# Patient Record
Sex: FEMALE | Race: WHITE
[De-identification: names, ages, dates, MRNs, and addresses within clinical notes are randomized per-mention and may not be internally consistent; named-entity substitution may affect disease eponyms.]

---

## 2017-04-20 ENCOUNTER — HOSPITAL ENCOUNTER (OUTPATIENT)
Dept: HOSPITAL 17 - HPND | Age: 27
End: 2017-04-20
Attending: OBSTETRICS & GYNECOLOGY
Payer: MEDICAID

## 2017-04-20 DIAGNOSIS — Z79.4: ICD-10-CM

## 2017-04-20 DIAGNOSIS — O24.011: Primary | ICD-10-CM

## 2017-04-20 PROCEDURE — 76801 OB US < 14 WKS SINGLE FETUS: CPT

## 2017-04-20 PROCEDURE — 76817 TRANSVAGINAL US OBSTETRIC: CPT

## 2017-05-03 ENCOUNTER — HOSPITAL ENCOUNTER (OUTPATIENT)
Dept: HOSPITAL 17 - HPND | Age: 27
End: 2017-05-03
Attending: OBSTETRICS & GYNECOLOGY
Payer: MEDICAID

## 2017-05-03 DIAGNOSIS — O36.80X0: ICD-10-CM

## 2017-05-03 DIAGNOSIS — O24.414: Primary | ICD-10-CM

## 2017-05-03 PROCEDURE — 76801 OB US < 14 WKS SINGLE FETUS: CPT

## 2017-05-03 PROCEDURE — 76817 TRANSVAGINAL US OBSTETRIC: CPT

## 2017-11-17 ENCOUNTER — HOSPITAL ENCOUNTER (EMERGENCY)
Dept: HOSPITAL 17 - NEPE | Age: 27
LOS: 1 days | Discharge: HOME | End: 2017-11-18
Payer: COMMERCIAL

## 2017-11-17 VITALS
DIASTOLIC BLOOD PRESSURE: 80 MMHG | RESPIRATION RATE: 16 BRPM | SYSTOLIC BLOOD PRESSURE: 148 MMHG | TEMPERATURE: 98.2 F | OXYGEN SATURATION: 98 % | HEART RATE: 77 BPM

## 2017-11-17 VITALS — HEIGHT: 67 IN | WEIGHT: 138.89 LBS | BODY MASS INDEX: 21.8 KG/M2

## 2017-11-17 DIAGNOSIS — B34.9: ICD-10-CM

## 2017-11-17 DIAGNOSIS — B96.89: ICD-10-CM

## 2017-11-17 DIAGNOSIS — O98.511: Primary | ICD-10-CM

## 2017-11-17 DIAGNOSIS — O23.41: ICD-10-CM

## 2017-11-17 DIAGNOSIS — O24.911: ICD-10-CM

## 2017-11-17 DIAGNOSIS — Z79.4: ICD-10-CM

## 2017-11-17 DIAGNOSIS — Z3A.11: ICD-10-CM

## 2017-11-17 LAB
ALP SERPL-CCNC: 65 U/L (ref 45–117)
ALT SERPL-CCNC: 18 U/L (ref 10–53)
ANION GAP SERPL CALC-SCNC: 11 MEQ/L (ref 5–15)
AST SERPL-CCNC: 19 U/L (ref 15–37)
BACTERIA #/AREA URNS HPF: (no result) /HPF
BASOPHILS # BLD AUTO: 0 TH/MM3 (ref 0–0.2)
BASOPHILS NFR BLD: 0.3 % (ref 0–2)
BILIRUB SERPL-MCNC: 0.3 MG/DL (ref 0.2–1)
BUN SERPL-MCNC: 12 MG/DL (ref 7–18)
CHLORIDE SERPL-SCNC: 102 MEQ/L (ref 98–107)
COLOR UR: YELLOW
COMMENT (UR): (no result)
CULTURE IF INDICATED: (no result)
EOSINOPHIL # BLD: 0.1 TH/MM3 (ref 0–0.4)
EOSINOPHIL NFR BLD: 0.8 % (ref 0–4)
ERYTHROCYTE [DISTWIDTH] IN BLOOD BY AUTOMATED COUNT: 13.5 % (ref 11.6–17.2)
GFR SERPLBLD BASED ON 1.73 SQ M-ARVRAT: 122 ML/MIN (ref 89–?)
GLUCOSE UR STRIP-MCNC: 70 MG/DL
HCO3 BLD-SCNC: 21.5 MEQ/L (ref 21–32)
HCT VFR BLD CALC: 41.9 % (ref 35–46)
HEMO FLAGS: (no result)
HGB UR QL STRIP: (no result)
KETONES UR STRIP-MCNC: 150 MG/DL
LEUKOCYTE ESTERASE UR QL STRIP: (no result) /HPF (ref 0–5)
LYMPHOCYTES # BLD AUTO: 1.6 TH/MM3 (ref 1–4.8)
LYMPHOCYTES NFR BLD AUTO: 17.8 % (ref 9–44)
MCH RBC QN AUTO: 27.8 PG (ref 27–34)
MCHC RBC AUTO-ENTMCNC: 32.8 % (ref 32–36)
MCV RBC AUTO: 84.5 FL (ref 80–100)
MONOCYTES NFR BLD: 8 % (ref 0–8)
MUCOUS THREADS #/AREA URNS LPF: (no result) /LPF
NEUTROPHILS # BLD AUTO: 6.6 TH/MM3 (ref 1.8–7.7)
NEUTROPHILS NFR BLD AUTO: 73.1 % (ref 16–70)
NITRITE UR QL STRIP: (no result)
PLATELET # BLD: 262 TH/MM3 (ref 150–450)
POTASSIUM SERPL-SCNC: 4 MEQ/L (ref 3.5–5.1)
RBC # BLD AUTO: 4.96 MIL/MM3 (ref 4–5.3)
RBC #/AREA URNS HPF: (no result) /HPF (ref 0–3)
SODIUM SERPL-SCNC: 134 MEQ/L (ref 136–145)
SP GR UR STRIP: 1.03 (ref 1–1.03)
SQUAMOUS #/AREA URNS HPF: > 8 /HPF (ref 0–5)
WBC # BLD AUTO: 9 TH/MM3 (ref 4–11)

## 2017-11-17 PROCEDURE — 81001 URINALYSIS AUTO W/SCOPE: CPT

## 2017-11-17 PROCEDURE — 96361 HYDRATE IV INFUSION ADD-ON: CPT

## 2017-11-17 PROCEDURE — 87086 URINE CULTURE/COLONY COUNT: CPT

## 2017-11-17 PROCEDURE — 87804 INFLUENZA ASSAY W/OPTIC: CPT

## 2017-11-17 PROCEDURE — 99284 EMERGENCY DEPT VISIT MOD MDM: CPT

## 2017-11-17 PROCEDURE — 85025 COMPLETE CBC W/AUTO DIFF WBC: CPT

## 2017-11-17 PROCEDURE — 80053 COMPREHEN METABOLIC PANEL: CPT

## 2017-11-17 PROCEDURE — 96374 THER/PROPH/DIAG INJ IV PUSH: CPT

## 2017-11-17 NOTE — PD
HPI


Chief Complaint:  Cold / Flu Symptoms


Time Seen by Provider:  22:28


Travel History


International Travel<30 days:  No


Contact w/Intl Traveler<30days:  No


Traveled to known affect area:  No





History of Present Illness


HPI


27-year-old female patient with history of diabetes, currently 11 weeks pregnant

, had an ultrasound done and pregnancy was normal last week, therefore several 

days of cough, cold symptoms, nasal congestion, nausea, flulike symptoms, 

subjective fevers.  She denies any vomiting, diarrhea, or other symptoms.  She 

does not know any sick contacts.





Modifying Factors: None


Associated Signs & Symptoms: Flulike symptoms


Risk Factors: Pregnant, diabetic





PFSH


Past Medical History


Asthma:  No


Autoimmune Disease:  No


Blood Disorders:  No


Anxiety:  Yes


Depression:  Yes


Heart Rhythm Problems:  No


Cancer:  No


Cardiovascular Problems:  No


Chest Pain:  No


Cystic Fibrosis:  No


Diabetes:  Yes


Patient Takes Glucophage:  No


Diminished Hearing:  No


Gastrointestinal Disorders:  Yes (HAD GALLBLADDER REMOVED)


Genitourinary:  No


Headaches:  No


Hypertension:  No


Medical other:  Yes


Musculoskeletal:  No


Neurologic:  No


Psychiatric:  No


Reproductive:  No


Respiratory:  No


Immunizations Current:  Yes


Sleep Apnea:  No


Tetanus Vaccination:  < 5 Years


Influenza Vaccination:  Yes


Pregnant?:  Pregnant


LMP:  17


:  1





Past Surgical History


Abdominal Surgery:  Yes (GALLBLADDER REMOVED )


AICD:  No


Appendectomy:  No


Arteriovenous Shunt:  No


Cardiac Surgery:  No


Cholecystectomy:  Yes


Ear Surgery:  No


Endocrine Surgery:  No


Eye Surgery:  No


Genitourinary Surgery:  No


Gynecologic Surgery:  No


Insulin Pump:  No


Joint Replacement:  No


Neurologic Surgery:  No


Oral Surgery:  No


Pacemaker:  No


Thoracic Surgery:  No


Other Surgery:  Yes





Social History


Alcohol Use:  No


Tobacco Use:  No


Substance Use:  No





Allergies-Medications


(Allergen,Severity, Reaction):  


Coded Allergies:  


     morphine (Verified  Adverse Reaction, Intermediate, migraines, 17)


Reported Meds & Prescriptions





Reported Meds & Active Scripts


Active


Reported


Lantus Inj (Insulin Glargine) 100 Unit/Ml Inj 25 Units SQ HS


Humalog Kwikpen Pen Inj (Insulin Lispro (Human) Inj) 300 Unit/3 Ml Pen 1 Units 

SQ 








Review of Systems


Except as stated in HPI:  all other systems reviewed are Neg





Physical Exam


Narrative


GENERAL: Well-developed young


SKIN: Focused skin assessment warm/dry.


HEAD: Atraumatic. Normocephalic. 


EYES: Pupils equal and round. No scleral icterus. No injection or drainage. 


ENT: No nasal bleeding or discharge.  Mucous membranes pink and moist.


NECK: Trachea midline. No JVD. 


CARDIOVASCULAR: Regular rate and rhythm.  No murmur appreciated.


RESPIRATORY: No accessory muscle use. Clear to auscultation. Breath sounds 

equal bilaterally. 


GASTROINTESTINAL: Abdomen soft, non-tender, nondistended. Hepatic and splenic 

margins not palpable. 


MUSCULOSKELETAL: No obvious deformities. No clubbing.  No cyanosis.  No edema. 


NEUROLOGICAL: Awake and alert. No obvious cranial nerve deficits.  Motor 

grossly within normal limits. Normal speech.


PSYCHIATRIC: Appropriate mood and affect; insight and judgment normal.





Data


Data


Last Documented VS





Vital Signs








  Date Time  Temp Pulse Resp B/P (MAP) Pulse Ox O2 Delivery O2 Flow Rate FiO2


 


17 22:28   18  98 Room Air  


 


17 21:49 98.2 77  148/80 (102)    








Orders





 Orders


Complete Blood Count With Diff (17 22:28)


Comprehensive Metabolic Panel (17 22:28)


Urinalysis - C+S If Indicated (17 22:28)


Influenzae A/B Antigen (17 22:28)


Sodium Chlor 0.9% 1000 Ml Inj (Ns 1000 M (17 22:30)


Ondansetron Inj (Zofran Inj) (17 22:30)





Labs





Laboratory Tests








Test


  17


20:35


 


White Blood Count 9.0 TH/MM3 


 


Red Blood Count 4.96 MIL/MM3 


 


Hemoglobin 13.8 GM/DL 


 


Hematocrit 41.9 % 


 


Mean Corpuscular Volume 84.5 FL 


 


Mean Corpuscular Hemoglobin 27.8 PG 


 


Mean Corpuscular Hemoglobin


Concent 32.8 % 


 


 


Red Cell Distribution Width 13.5 % 


 


Platelet Count 262 TH/MM3 


 


Mean Platelet Volume 8.1 FL 


 


Neutrophils (%) (Auto) 73.1 % 


 


Lymphocytes (%) (Auto) 17.8 % 


 


Monocytes (%) (Auto) 8.0 % 


 


Eosinophils (%) (Auto) 0.8 % 


 


Basophils (%) (Auto) 0.3 % 


 


Neutrophils # (Auto) 6.6 TH/MM3 


 


Lymphocytes # (Auto) 1.6 TH/MM3 


 


Monocytes # (Auto) 0.7 TH/MM3 


 


Eosinophils # (Auto) 0.1 TH/MM3 


 


Basophils # (Auto) 0.0 TH/MM3 


 


CBC Comment DIFF FINAL 


 


Differential Comment  


 


Urine Color YELLOW 


 


Urine Turbidity HAZY 


 


Urine pH 6.0 


 


Urine Specific Gravity 1.031 


 


Urine Protein 30 mg/dL 


 


Urine Glucose (UA) 70 mg/dL 


 


Urine Ketones 150 mg/dL 


 


Urine Occult Blood TRACE 


 


Urine Nitrite NEG 


 


Urine Bilirubin NEG 


 


Urine Urobilinogen 2.0 MG/DL 


 


Urine Leukocyte Esterase LARGE 


 


Blood Urea Nitrogen 12 MG/DL 


 


Creatinine 0.60 MG/DL 


 


Random Glucose 84 MG/DL 


 


Total Protein 7.6 GM/DL 


 


Albumin 3.3 GM/DL 


 


Calcium Level 9.1 MG/DL 


 


Alkaline Phosphatase 65 U/L 


 


Aspartate Amino Transf


(AST/SGOT) 19 U/L 


 


 


Alanine Aminotransferase


(ALT/SGPT) 18 U/L 


 


 


Total Bilirubin 0.3 MG/DL 


 


Sodium Level 134 MEQ/L 


 


Potassium Level 4.0 MEQ/L 


 


Chloride Level 102 MEQ/L 


 


Carbon Dioxide Level 21.5 MEQ/L 


 


Anion Gap 11 MEQ/L 


 


Estimat Glomerular Filtration


Rate 122 ML/MIN 


 











MDM


Medical Decision Making


Medical Screen Exam Complete:  Yes


Emergency Medical Condition:  Yes


Medical Record Reviewed:  Yes


Interpretation(s)





Laboratory Tests








Test


  17


20:35


 


Neutrophils (%) (Auto)


  73.1 %


(16.0-70.0)


 


Urine Turbidity HAZY (CLEAR) 


 


Urine Protein


  30 mg/dL


(NEG-TRACE)


 


Urine Glucose (UA) 70 mg/dL (NEG) 


 


Urine Ketones


  150 mg/dL


(NEG)


 


Urine Occult Blood TRACE (NEG) 


 


Urine Leukocyte Esterase LARGE (NEG) 


 


Albumin


  3.3 GM/DL


(3.4-5.0)


 


Sodium Level


  134 MEQ/L


(136-145)








Differential Diagnosis


Flulike symptoms: Influenza versus viral syndrome versus dehydration versus UTI 

versus metabolic issues


Narrative Course


Vital signs are stable in the ER.  Abdomen is benign and I do not suspect an 

acute intra-abdominal process.  Patient had confirmed pregnancy ultrasound a 

week ago.  Influenza test is negative.  Her urine does show signs of UTI.  My 

plan would be to treat her UTI as well.  We will have her follow-up with 

primary care physician.  Return for any worsening in symptoms as needed.  The 

plan has been discussed with her and she states understanding.





Diagnosis





 Primary Impression:  


 Viral syndrome


 Additional Impression:  


 UTI (urinary tract infection)


***Med/Other Pt SpecificInfo:  Prescription(s) given


Scripts


Metoclopramide (Reglan) 10 Mg Tab


10 MG PO QID Y for NAUSEA OR VOMITING, #12 TAB 0 Refills


   Prov: Dada Gonzalez MD         17 


Loratadine (Claritin) 10 Mg Cap


10 MG PO DAILY for Allergy Management, #10 CAP 0 Refills


   Prov: Dada Gonzalez MD         17 


Amoxicillin (Amoxicillin) 500 Mg Cap


500 MG PO BID for Infection for 7 Days, #14 CAP 0 Refills


   Prov: Dada Gonzalez MD         17


Disposition:  01 DISCHARGE HOME


Condition:  Stable











Dada Gonzalez MD 2017 22:35

## 2018-01-17 ENCOUNTER — HOSPITAL ENCOUNTER (OUTPATIENT)
Dept: HOSPITAL 17 - HPND | Age: 28
End: 2018-01-17
Payer: COMMERCIAL

## 2018-01-17 DIAGNOSIS — O24.012: Primary | ICD-10-CM

## 2018-01-17 PROCEDURE — 76811 OB US DETAILED SNGL FETUS: CPT

## 2018-02-07 ENCOUNTER — HOSPITAL ENCOUNTER (OUTPATIENT)
Dept: HOSPITAL 17 - HPND | Age: 28
End: 2018-02-07
Payer: COMMERCIAL

## 2018-02-07 DIAGNOSIS — O35.1XX0: Primary | ICD-10-CM

## 2018-02-07 PROCEDURE — 76816 OB US FOLLOW-UP PER FETUS: CPT

## 2018-02-07 PROCEDURE — 76827 ECHO EXAM OF FETAL HEART: CPT

## 2018-02-07 PROCEDURE — 76825 ECHO EXAM OF FETAL HEART: CPT

## 2018-02-07 PROCEDURE — 93325 DOPPLER ECHO COLOR FLOW MAPG: CPT

## 2018-02-15 ENCOUNTER — HOSPITAL ENCOUNTER (INPATIENT)
Dept: HOSPITAL 17 - HOBED | Age: 28
LOS: 2 days | Discharge: HOME | DRG: 781 | End: 2018-02-17
Attending: OBSTETRICS & GYNECOLOGY | Admitting: OBSTETRICS & GYNECOLOGY
Payer: COMMERCIAL

## 2018-02-15 VITALS
RESPIRATION RATE: 18 BRPM | DIASTOLIC BLOOD PRESSURE: 71 MMHG | HEART RATE: 89 BPM | SYSTOLIC BLOOD PRESSURE: 111 MMHG | TEMPERATURE: 98.7 F

## 2018-02-15 VITALS — RESPIRATION RATE: 18 BRPM | TEMPERATURE: 98.3 F

## 2018-02-15 VITALS — HEIGHT: 67 IN | BODY MASS INDEX: 23.88 KG/M2 | WEIGHT: 152.12 LBS

## 2018-02-15 VITALS — RESPIRATION RATE: 18 BRPM

## 2018-02-15 VITALS — SYSTOLIC BLOOD PRESSURE: 124 MMHG | DIASTOLIC BLOOD PRESSURE: 80 MMHG | HEART RATE: 97 BPM

## 2018-02-15 DIAGNOSIS — E10.65: ICD-10-CM

## 2018-02-15 DIAGNOSIS — O24.012: Primary | ICD-10-CM

## 2018-02-15 DIAGNOSIS — Z79.4: ICD-10-CM

## 2018-02-15 DIAGNOSIS — Q61.4: ICD-10-CM

## 2018-02-15 DIAGNOSIS — R06.02: ICD-10-CM

## 2018-02-15 DIAGNOSIS — Z3A.24: ICD-10-CM

## 2018-02-15 DIAGNOSIS — O36.8120: ICD-10-CM

## 2018-02-15 DIAGNOSIS — R07.9: ICD-10-CM

## 2018-02-15 LAB
BACTERIA #/AREA URNS HPF: (no result) /HPF
BASOPHILS # BLD AUTO: 0 TH/MM3 (ref 0–0.2)
BASOPHILS NFR BLD: 0.2 % (ref 0–2)
BUN SERPL-MCNC: 11 MG/DL (ref 7–18)
CALCIUM SERPL-MCNC: 8.6 MG/DL (ref 8.5–10.1)
CHLORIDE SERPL-SCNC: 105 MEQ/L (ref 98–107)
COLOR UR: (no result)
CREAT SERPL-MCNC: 0.59 MG/DL (ref 0.5–1)
EOSINOPHIL # BLD: 0.1 TH/MM3 (ref 0–0.4)
EOSINOPHIL NFR BLD: 1.6 % (ref 0–4)
ERYTHROCYTE [DISTWIDTH] IN BLOOD BY AUTOMATED COUNT: 13.6 % (ref 11.6–17.2)
GFR SERPLBLD BASED ON 1.73 SQ M-ARVRAT: 124 ML/MIN (ref 89–?)
GLUCOSE SERPL-MCNC: 172 MG/DL (ref 74–106)
GLUCOSE UR STRIP-MCNC: 1000 MG/DL
HBA1C MFR BLD: 9.7 % (ref 4.3–6)
HCO3 BLD-SCNC: 24.8 MEQ/L (ref 21–32)
HCT VFR BLD CALC: 33.6 % (ref 35–46)
HGB BLD-MCNC: 11.4 GM/DL (ref 11.6–15.3)
HGB UR QL STRIP: (no result)
KETONES UR STRIP-MCNC: 40 MG/DL
LYMPHOCYTES # BLD AUTO: 1.9 TH/MM3 (ref 1–4.8)
LYMPHOCYTES NFR BLD AUTO: 21.9 % (ref 9–44)
MCH RBC QN AUTO: 28.5 PG (ref 27–34)
MCHC RBC AUTO-ENTMCNC: 34 % (ref 32–36)
MCV RBC AUTO: 83.9 FL (ref 80–100)
MONOCYTE #: 0.4 TH/MM3 (ref 0–0.9)
MONOCYTES NFR BLD: 5 % (ref 0–8)
NEUTROPHILS # BLD AUTO: 6.2 TH/MM3 (ref 1.8–7.7)
NEUTROPHILS NFR BLD AUTO: 71.3 % (ref 16–70)
NITRITE UR QL STRIP: (no result)
PLATELET # BLD: 252 TH/MM3 (ref 150–450)
PMV BLD AUTO: 8.6 FL (ref 7–11)
RBC # BLD AUTO: 4 MIL/MM3 (ref 4–5.3)
SODIUM SERPL-SCNC: 137 MEQ/L (ref 136–145)
SP GR UR STRIP: 1.03 (ref 1–1.03)
SQUAMOUS #/AREA URNS HPF: 2 /HPF (ref 0–5)
URINE LEUKOCYTE ESTERASE: (no result)
WBC # BLD AUTO: 8.7 TH/MM3 (ref 4–11)

## 2018-02-15 PROCEDURE — 99283 EMERGENCY DEPT VISIT LOW MDM: CPT

## 2018-02-15 PROCEDURE — G0481 DRUG TEST DEF 8-14 CLASSES: HCPCS

## 2018-02-15 PROCEDURE — 81001 URINALYSIS AUTO W/SCOPE: CPT

## 2018-02-15 PROCEDURE — 80048 BASIC METABOLIC PNL TOTAL CA: CPT

## 2018-02-15 PROCEDURE — 93005 ELECTROCARDIOGRAM TRACING: CPT

## 2018-02-15 PROCEDURE — 83036 HEMOGLOBIN GLYCOSYLATED A1C: CPT

## 2018-02-15 PROCEDURE — 85025 COMPLETE CBC W/AUTO DIFF WBC: CPT

## 2018-02-15 PROCEDURE — 80307 DRUG TEST PRSMV CHEM ANLYZR: CPT

## 2018-02-15 PROCEDURE — 82947 ASSAY GLUCOSE BLOOD QUANT: CPT

## 2018-02-15 PROCEDURE — 82948 REAGENT STRIP/BLOOD GLUCOSE: CPT

## 2018-02-15 RX ADMIN — INSULIN ASPART SCH UNITS: 100 INJECTION, SOLUTION INTRAVENOUS; SUBCUTANEOUS at 18:38

## 2018-02-15 NOTE — PD
History of Present Illness


History of Present Illness


Plan of Care Note





I spoke with the pt and her  about her current pregnancy/DM status.  

Briefly, she is a 26y/o  @ 24.1wks.  She has a h/o Type 1DM dx'd at age 

7.  She had PNC in [place] but has not been seen since December.  She is 

followed by an endocrinologist and states that she takes 23 units lantus qHS 

and SSI humalog with meals.  She averages 140s fasting and 200-300 post 

prandial.  She states her A1C is 13 --> 8.8 currently.  She reports that her 

endocrinologist said her current sugars are good.  She has a h/o DKA multiple 

times.  





I spoke with pt and and  about glycemic control needs in pregnancy being 

<95 fasting and <140 2hr post prandial.  We reviewed increased risk of 

pregnancy loss, cardiac defects in baby, preE, macrosomia, and multiple other 

complications.  Advised pt that the best option would be to have [facility] admission, 

diabetic education, diabetic patterning, and obtain baseline labs.  





Pt and  agreed with admission.  Plan to include:





-- diabetic education


-- fetal echo (already performed)


-- 24hr urine protein collection


-- FSBS fasting, 2hr PP, HS, 3am


-- diabetic patterning as follows:


          - wt in kg x 0.7 factor (2nd TM) = total insulin daily requirement


          - 1/2 lantus in pm (24 units);  1/2 humalog with meals ()


          - titrate PRN


-- SCDs, regular diet


-- CBC, BMP, T&S, A1C


-- BID fetal strips











Quang Syed MD Feb 15, 2018 17:09

## 2018-02-15 NOTE — HHI.HP
HPI


Travel History


International Travel<30 Days:  No


Contact w/Intl Traveler<30Days:  No


Known Affected Area:  No





History of Present Illness


HPI


Mrs. Krishna is a 27 year old  at 24/1 weeks gestation with a past medical 

history of type 1 diabetes presenting to the OB ED today for cramping.  She 

states that this started a few days ago.  However today she experienced a sharp 

pain in her abdomen when she got out of the chair.  She described this pain as 

being above and below her belly that lasted for 2 minutes and caused her to cry 

out in pain.  She is also stating that she has decreased fetal movement.  She 

states that the baby has not been active like he usually is.  However, has been 

active since coming to the hospital.  She has experienced chest pain on and off 

that she describes as chest tightening and pressure that occurs at rest but 

causes shortness of breath.  She states that this tightness and pressure is 

relieved by laying back.





No vaginal bleeding, no leakage of fluids, no contractions, no dysuria, no leg 

pain.





Type I DM-Lantus 23 units at night and Humalog sliding scale.  States that her 

fasting sugars in the morning is in the 140s and postprandials are in the 200s.

  Patient states that she drinks 2 bottles of flavored water a day, some Propel 

energy drinks, some sodas, and a couple of non-caffeinated coffee





History


Past Medical History


*** Narrative Medical


Type 1 diabetes, sees an endocrinologist regularly





Obstetric History


Obstetric History





Spontaneous  at 5 weeks gestation, 2017





Past Surgical History


*** Narrative Surgical


Cholecystectomy





Family History


*** Narrative Family History


Mother-SLE, MS, transverse myelitis, Bartter's syndrome


Father-hypertension





Social History


*** Narrative Social History


Lives with her  and grandparents


Does not work outside the home


Denies alcohol, tobacco, illicit drug use


Alcohol Use:  No


Tobacco Use:  No


Substance Abuse:  No





Allergies-Medications


(Allergen,Severity, Reaction):  


Coded Allergies:  


     morphine (Verified  Adverse Reaction, Intermediate, migraines, 17)


Home Meds


Active Scripts


Metoclopramide (Reglan) 10 Mg Tab, 10 MG PO QID Y for NAUSEA OR VOMITING, #12 

TAB 0 Refills


   Prov:Dada Gonzalez MD         17


Loratadine (Claritin) 10 Mg Cap, 10 MG PO DAILY for Allergy Management, #10 CAP 

0 Refills


   Prov:Dada Gonzalez MD         17


Amoxicillin (Amoxicillin) 500 Mg Cap, 500 MG PO BID for Infection for 7 Days, #

14 CAP 0 Refills


   Prov:Dada Gonzalez MD         17


Reported Medications


Insulin Glargine Inj (Lantus Inj) 100 Unit/Ml Inj, 25 UNITS SQ HS


   17


Insulin Lispro (Human) Inj (Humalog Kwikpen Pen Inj) 300 Unit/3 Ml Pen, 1 UNITS 

SQ for Blood Sugar Management, PEN 0 Refills


   17





Review of Systems


General / Constitutional:  No: Fever, Chills


Eyes:  No: Blurred Vision


HENT:  No: Headaches


Cardiovascular:  Chest Pain or Discomfort


Respiratory:  Short of Breath, No: Cough


Gastrointestinal:  No: Nausea, Vomiting, Diarrhea, Constipation


Genitourinary:  No: Dysuria


Musculoskeletal:  No: Weakness


Skin:  No Rash


Neurologic:  No: Dizziness





Physical Exam


Narrative


GENERAL: Well-nourished, well-developed patient.


SKIN: Warm and dry.


HEAD: Normocephalic and atraumatic.


EYES: No scleral icterus. No injection or drainage. 


ENT: No nasal drainage noted. Mucous membranes pink. Airway patent.


NECK: Supple, trachea midline. No JVD.


CARDIOVASCULAR: Regular rate and rhythm without murmurs, gallops, or rubs. 


RESPIRATORY: Breath sounds equal bilaterally. No accessory muscle use.


BREASTS: Bilateral exam showed no masses , no retractions, no nipple discharge.


ABDOMEN/GI: Abdomen soft, non-tender, bowel sounds present, no rebound, no 

guarding 


   Gravid to 24 weeks size


FHT's: 


   Category: 1   


   Baseline: 150s   


   Reactive: yes   


   Variability: moderate  


   Decels: rare variable  


EXTREMITIES: No cyanosis or edema.


BACK: Nontender without obvious deformity. No CVA tenderness.


NEUROLOGICAL: Awake and alert. Motor and sensory grossly within normal limits. 

Five out of 5 muscle strength in all muscle groups. Normal speech.





Caprini VTE Risk Assessment


Caprini VTE Risk Assessment:  Mod/High Risk (score >= 2)


Caprini Risk Assessment Model











 Point Value = 1          Point Value = 2  Point Value = 3  Point Value = 5


 


Age 41-60


Minor surgery


BMI > 25 kg/m2


Swollen legs


Varicose veins


Pregnancy or postpartum


History of unexplained or recurrent


   spontaneous 


Oral contraceptives or hormone


   replacement


Sepsis (< 1 month)


Serious lung disease, including


   pneumonia (< 1 month)


Abnormal pulmonary function


Acute myocardial infarction


Congestive heart failure (< 1 month)


History of inflammatory bowel disease


Medical patient at bed rest Age 61-74


Arthroscopic surgery


Major open surgery (> 45 min)


Laparoscopic surgery (> 45 min)


Malignancy


Confined to bed (> 72 hours)


Immobilizing plaster cast


Central venous access Age >= 75


History of VTE


Family history of VTE


Factor V Leiden


Prothrombin 23349K


Lupus anticoagulant


Anticardiolipin antibodies


Elevated serum homocysteine


Heparin-induced thrombocytopenia


Other congenital or acquired


   thrombophilia Stroke (< 1 month)


Elective arthroplasty


Hip, pelvis, or leg fracture


Acute spinal cord injury (< 1 month)








Prophylaxis Regimen











   Total Risk


Factor Score Risk Level Prophylaxis Regimen


 


0-1      Low Early ambulation


 


2 Moderate Order ONE of the following:


*Sequential Compression Device (SCD)


*Heparin 5000 units SQ BID


 


3-4 Higher Order ONE of the following medications:


*Heparin 5000 units SQ TID


*Enoxaparin/Lovenox 40 mg SQ daily (WT < 150 kg, CrCl > 30 mL/min)


*Enoxaparin/Lovenox 30 mg SQ daily (WT < 150 kg, CrCl > 10-29 mL/min)


*Enoxaparin/Lovenox 30 mg SQ BID (WT < 150 kg, CrCl > 30 mL/min)


AND/OR


*Sequential Compression Device (SCD)


 


5 or more Highest Order ONE of the following medications:


*Heparin 5000 units SQ TID (Preferred with Epidurals)


*Enoxaparin/Lovenox 40 mg SQ daily (WT < 150 kg, CrCl > 30 mL/min)


*Enoxaparin/Lovenox 30 mg SQ daily (WT < 150 kg, CrCl > 10-29 mL/min)


*Enoxaparin/Lovenox 30 mg SQ BID (WT < 150 kg, CrCl > 30 mL/min)


AND


*Sequential Compression Device (SCD)











Data


Data


Vital Signs Reviewed:  Yes


Orders





 Orders


Vital Signs (Adult) .ON ADMISSION (2/15/18 15:41)


^ Labor Status (2/15/18 15:41)


^ Non Stress Test (2/15/18 15:41)


^ Hydration (2/15/18 15:41)


Bedside Glucose MARK.CSUGAR (2/15/18 15:45)


Electrocardiogram (2/15/18 )


Ob (2e) Additional Admit Info (2/15/18 16:44)


Admit To Inpatient (2/15/18 )


Diet Regular Basic (2/15/18 Dinner)


Vital Signs (Adult) MARK.Q0M-SUOBW AWAKE (2/15/18 16:43)


Fetal Heart MARK.QD (2/15/18 16:43)


Activity Oob Ad Katia (2/15/18 16:43)


Complete Blood Count With Diff (2/15/18 16:43)


Basic Metabolic Panel (Bmp) (2/15/18 16:43)


Total Protein 24hr Urine (2/15/18 16:43)


Bedside Glucose .AS ORDERED (2/15/18 16:43)


Acetaminophen (Tylenol) (2/15/18 16:45)


Multivit-Min-Folic-Iron Prenat (Stuartna (18 09:00)


Sodium Chloride 0.9% Flush (Ns Flush) (2/15/18 21:00)


Sodium Chloride 0.9% Flush (Ns Flush) (2/15/18 16:45)


Ondansetron  Odt (Zofran  Odt) (2/15/18 16:45)


Hold Clot (2/15/18 16:43)


Inpatient Certification (2/15/18 )


Scd Bilateral/Knee High MARK.QSHIFT (2/15/18 16:43)


Consult Diabetic Educator (2/15/18 )





Assessment/Plan


Assessment and Plan


See attending plan of care note











Andreea Sierra MD R1 Feb 15, 2018 17:01

## 2018-02-15 NOTE — PD
HPI


Chief Complaint


Cramping


Date Seen:  Feb 15, 2018


Time Seen:  15:18


Travel History


International Travel<30 Days:  No


Contact w/Intl Traveler<30Days:  No


Known Affected Area:  No





History of Present Illness


HPI


Mrs. Krishna is a 27 year old  at 24/1 weeks gestation with a past medical 

history of type 1 diabetes presenting to the OB ED today for cramping.  She 

states that this started a few days ago.  However today she experienced a sharp 

pain in her abdomen when she got out of the chair.  She described this pain as 

being above and below her belly that lasted for 2 minutes and caused her to cry 

out in pain.  She is also stating that she has decreased fetal movement.  She 

states that the baby has not been active like he usually is.  However, has been 

active since coming to the hospital.  She has experienced chest pain on and off 

that she describes as chest tightening and pressure that occurs at rest but 

causes shortness of breath.  She states that this tightness and pressure is 

relieved by laying back.





No vaginal bleeding, no leakage of fluids, no contractions, no dysuria, no leg 

pain.





Type I DM-Lantus 23 units at night and Humalog sliding scale.  States that her 

fasting sugars in the morning is in the 140s and postprandials are in the 200s.

  Patient states that she drinks 2 bottles of flavored water a day, some Propel 

energy drinks, some sodas, and a couple of non-caffeinated coffee


Weeks Gestation:  24


Para:  0


:  2





History


Past Medical History


*** Narrative Medical


Type 1 diabetes, sees an endocrinologist regularly





Obstetric History


Obstetric History





Spontaneous  at 5 weeks gestation, 2017





Past Surgical History


*** Narrative Surgical


Cholecystectomy





Family History


*** Narrative Family History


Mother-SLE, MS, transverse myelitis, Bartter's syndrome


Father-hypertension





Social History


*** Narrative Social History


Lives with her  and grandparents


Does not work outside the home


Denies alcohol, tobacco, illicit drug use


Alcohol Use:  No


Tobacco Use:  No


Substance Abuse:  No





Allergies-Medications


(Allergen,Severity, Reaction):  


Coded Allergies:  


     morphine (Verified  Adverse Reaction, Intermediate, migraines, 17)


Home Meds


Active Scripts


Metoclopramide (Reglan) 10 Mg Tab, 10 MG PO QID Y for NAUSEA OR VOMITING, #12 

TAB 0 Refills


   Prov:Soontharothai,Rewadee MD         17


Loratadine (Claritin) 10 Mg Cap, 10 MG PO DAILY for Allergy Management, #10 CAP 

0 Refills


   Prov:Dada Gonzalez MD         17


Amoxicillin (Amoxicillin) 500 Mg Cap, 500 MG PO BID for Infection for 7 Days, #

14 CAP 0 Refills


   Prov:Dada Gonzalez MD         17


Reported Medications


Insulin Glargine Inj (Lantus Inj) 100 Unit/Ml Inj, 25 UNITS SQ HS


   17


Insulin Lispro (Human) Inj (Humalog Kwikpen Pen Inj) 300 Unit/3 Ml Pen, 1 UNITS 

SQ for Blood Sugar Management, PEN 0 Refills


   17





Review of Systems


General / Constitutional:  No: Fever, Chills


Eyes:  No: Blurred Vision


Cardiovascular:  Chest Pain or Discomfort


Respiratory:  Short of Breath


Gastrointestinal:  Abdominal Pain, No: Nausea, Vomiting, Diarrhea, Constipation


Genitourinary:  No: Dysuria


Musculoskeletal:  No: Weakness


Skin:  No Rash


Neurologic:  No: Dizziness





Physical Exam


Narrative


GENERAL: Well-nourished, well-developed patient.


SKIN: Warm and dry.


HEAD: Normocephalic and atraumatic.


EYES: No scleral icterus. No injection or drainage. 


ENT: No nasal drainage noted. Mucous membranes pink. Airway patent.


NECK: Supple, trachea midline. No JVD.


CARDIOVASCULAR: Regular rate and rhythm without murmurs, gallops, or rubs. 


RESPIRATORY: Breath sounds equal bilaterally. No accessory muscle use.


BREASTS: Bilateral exam showed no masses , no retractions, no nipple discharge.


ABDOMEN/GI: Abdomen soft, non-tender, bowel sounds present, no rebound, no 

guarding 


   Gravid to 24 weeks size


FHT's: 


   Category: 1   


   Baseline: 150s   


   Reactive: yes   


   Variability: moderate  


   Decels: rare variable  


EXTREMITIES: No cyanosis or edema.


BACK: Nontender without obvious deformity. No CVA tenderness.


NEUROLOGICAL: Awake and alert. Motor and sensory grossly within normal limits. 

Five out of 5 muscle strength in all muscle groups. Normal speech.





Data


Data


Orders





 Orders


Vital Signs (Adult) .ON ADMISSION (2/15/18 15:41)


^ Labor Status (2/15/18 15:41)


^ Non Stress Test (2/15/18 15:41)


^ Hydration (2/15/18 15:41)





MDM


Plan


27-year-old  with past medical history of type 1 diabetes presenting with 

cramping and occasional chest tightness/pressure and shortness of breath at 

rest.  Patient has not had control sugars at home. Will admit due to 

uncontrolled blood sugars.





FHT shows category 1 tracing


Urine dip reveals 500 glucose and 40 ketones, bedside glucose 326


-We will counseled patient on tighter glucose control


-Encouraged patient to increase water intake


-Will obtain a stat EKG to assess chest tightness/pressure


Diagnosis


Diagnosis:  


 Primary Impression:  


 24 weeks gestation of pregnancy


 Additional Impression:  


 Uncontrolled type 1 diabetes mellitus











Andreea Sierra MD R1 Feb 15, 2018 15:45

## 2018-02-16 VITALS — RESPIRATION RATE: 16 BRPM

## 2018-02-16 VITALS
HEART RATE: 80 BPM | TEMPERATURE: 98.3 F | SYSTOLIC BLOOD PRESSURE: 98 MMHG | RESPIRATION RATE: 16 BRPM | DIASTOLIC BLOOD PRESSURE: 56 MMHG

## 2018-02-16 VITALS — TEMPERATURE: 98.1 F | RESPIRATION RATE: 16 BRPM

## 2018-02-16 VITALS — SYSTOLIC BLOOD PRESSURE: 114 MMHG | HEART RATE: 91 BPM | DIASTOLIC BLOOD PRESSURE: 65 MMHG

## 2018-02-16 VITALS — HEART RATE: 88 BPM | DIASTOLIC BLOOD PRESSURE: 70 MMHG | SYSTOLIC BLOOD PRESSURE: 115 MMHG

## 2018-02-16 VITALS — SYSTOLIC BLOOD PRESSURE: 99 MMHG | DIASTOLIC BLOOD PRESSURE: 49 MMHG | HEART RATE: 82 BPM

## 2018-02-16 VITALS — TEMPERATURE: 98.5 F

## 2018-02-16 VITALS — RESPIRATION RATE: 18 BRPM

## 2018-02-16 VITALS — TEMPERATURE: 97.7 F

## 2018-02-16 RX ADMIN — INSULIN ASPART SCH UNITS: 100 INJECTION, SOLUTION INTRAVENOUS; SUBCUTANEOUS at 10:15

## 2018-02-16 RX ADMIN — INSULIN ASPART SCH UNITS: 100 INJECTION, SOLUTION INTRAVENOUS; SUBCUTANEOUS at 08:00

## 2018-02-16 RX ADMIN — Medication SCH ML: at 21:00

## 2018-02-16 RX ADMIN — Medication SCH ML: at 09:00

## 2018-02-16 NOTE — PD.OB.ANTE
Subjective


Interval History


Eating breakfast this morning. Denies any new symptoms. Low blood sugar this 

morning, given orange juice.


Antepartum ROS:  Reports: Fetal movement normal, 


   Denies: New complaints, Loss of fluid, Vaginal bleeding, Contractions





Objective


Vital Signs





Vital Signs








  Date Time  Temp Pulse Resp B/P (MAP) Pulse Ox O2 Delivery O2 Flow Rate FiO2


 


18 07:43 97.7       


 


18 07:42   18     


 


18 07:41  91  114/65 (81)    


 


18 06:00   16     


 


18 04:00   16     


 


18 01:36 98.5       


 


18 01:32  88  115/70 (85)    


 


18 00:32   16     


 


2/15/18 23:00   18     


 


2/15/18 20:50  89  111/71 (84)    


 


2/15/18 20:50 98.7  18     


 


2/15/18 17:30 98.3       


 


2/15/18 17:30   18     


 


2/15/18 17:19  97  124/80 (95)    








Lab & Micro Results











Test


  2/15/18


15:15 2/15/18


17:35 18


09:20


 


Urine Color LIGHT-YELLOW   


 


Urine Turbidity CLEAR   


 


Urine pH 6.0   


 


Urine Specific Gravity 1.035   


 


Urine Protein NEG mg/dL   


 


Urine Glucose (UA) 1000 mg/dL   


 


Urine Ketones 40 mg/dL   


 


Urine Occult Blood NEG   


 


Urine Nitrite NEG   


 


Urine Bilirubin NEG   


 


Urine Urobilinogen


  LESS THAN 2.0


MG/DL 


  


 


 


Urine Leukocyte Esterase TRACE   


 


Urine RBC


  LESS THAN 1


/hpf 


  


 


 


Urine WBC 1 /hpf   


 


Urine Squamous Epithelial


Cells 2 /hpf 


  


  


 


 


Urine Bacteria RARE /hpf   


 


Microscopic Urinalysis Comment


  CULT NOT


INDICATED 


  


 


 


Urine Opiates Screen NEG   


 


Urine Barbiturates Screen NEG   


 


Urine Amphetamines Screen NEG   


 


Urine Benzodiazepines Screen NEG   


 


Urine Cocaine Screen NEG   


 


Urine Cannabinoids Screen NEG   


 


White Blood Count  8.7 TH/MM3  


 


Red Blood Count  4.00 MIL/MM3  


 


Hemoglobin  11.4 GM/DL  


 


Hematocrit  33.6 %  


 


Mean Corpuscular Volume  83.9 FL  


 


Mean Corpuscular Hemoglobin  28.5 PG  


 


Mean Corpuscular Hemoglobin


Concent 


  34.0 % 


  


 


 


Red Cell Distribution Width  13.6 %  


 


Platelet Count  252 TH/MM3  


 


Mean Platelet Volume  8.6 FL  


 


Neutrophils (%) (Auto)  71.3 %  


 


Lymphocytes (%) (Auto)  21.9 %  


 


Monocytes (%) (Auto)  5.0 %  


 


Eosinophils (%) (Auto)  1.6 %  


 


Basophils (%) (Auto)  0.2 %  


 


Neutrophils # (Auto)  6.2 TH/MM3  


 


Lymphocytes # (Auto)  1.9 TH/MM3  


 


Monocytes # (Auto)  0.4 TH/MM3  


 


Eosinophils # (Auto)  0.1 TH/MM3  


 


Basophils # (Auto)  0.0 TH/MM3  


 


CBC Comment  DIFF FINAL  


 


Differential Comment    


 


Blood Urea Nitrogen  11 MG/DL  


 


Creatinine  0.59 MG/DL  


 


Random Glucose  172 MG/DL  179 MG/DL 


 


Calcium Level  8.6 MG/DL  


 


Sodium Level  137 MEQ/L  


 


Potassium Level  3.5 MEQ/L  


 


Chloride Level  105 MEQ/L  


 


Carbon Dioxide Level  24.8 MEQ/L  


 


Anion Gap  7 MEQ/L  


 


Estimat Glomerular Filtration


Rate 


  124 ML/MIN 


  


 


 


Hemoglobin A1c  9.7 %  








Physical Exam


GENERAL: Well-nourished, well-developed patient.


CARDIOVASCULAR: Regular rate and rhythm without murmurs, gallops, or rubs.


RESPIRATORY: Breath sounds equal bilaterally. No accessory muscle use.


ABDOMEN/GI: Abdomen soft, non-tender. Gravid to 24 weeks


GENITOURINARY:


  Uterine Contractions: none


FHT's:


  Category: 1


  Baseline: 130


  Reactive: yes


  Variability: moderate


  Decels: none


EXTREMITIES: No cyanosis or edema, non-tender, without signs of DVT.





Assessment and Plan


Assessment and Plan


28 y/o  at 24 weeks, T1DM admitted for poor glycemic control


A1c 9.7





-- diabetic education


-- 24hr urine protein collection


-- FSBS fasting, 2hr PP, HS, 3am


-- diabetic patterning as follows:


   -Levemir 20U HS


   -Novolog  breakfast/lunch & 12 at dinner


   -2200 ADA diet


-- SCDs


-- BID fetal strips











Dylon Chong MD 2018 10:17

## 2018-02-17 VITALS — SYSTOLIC BLOOD PRESSURE: 101 MMHG | HEART RATE: 95 BPM | DIASTOLIC BLOOD PRESSURE: 62 MMHG

## 2018-02-17 VITALS — TEMPERATURE: 97.9 F

## 2018-02-17 VITALS — DIASTOLIC BLOOD PRESSURE: 58 MMHG | SYSTOLIC BLOOD PRESSURE: 92 MMHG | HEART RATE: 76 BPM

## 2018-02-17 RX ADMIN — INSULIN ASPART SCH UNITS: 100 INJECTION, SOLUTION INTRAVENOUS; SUBCUTANEOUS at 09:20

## 2018-02-17 RX ADMIN — Medication SCH ML: at 09:00

## 2018-02-17 NOTE — PD.OB.ANTE
Subjective


Diagnosis:  


(1) 24 weeks gestation of pregnancy


Diagnosis:  Principal





(2) Uncontrolled type 1 diabetes mellitus


Diagnosis:  Principal





Interval History


Patient is a 27-year-old  at approximately 24 weeks with type 1 diabetes 

who presented for management of poorly controlled blood sugars.


She has since admission been initiated on Levemir 20 mg at night with 3 times 

daily dosing of NovoLog (8 units breakfast, 8 units lunchtime, 12 units dinner).


She notes she was previously on these medications but her insurance did not 

cover them and she has since switched to Medicaid which will cover it.


She has blood sugar monitoring supplies at home.


She denies any symptoms of hypoglycemia including headache, jitters, diaphoresis

, altered mentation.


She is ready to go home today and notes she has a follow-up appointment with 

careful women on .


Antepartum ROS:  Reports: Fetal movement normal, 


   Denies: New complaints, Loss of fluid, Vaginal bleeding, Contractions





Objective


Vital Signs





Vital Signs








  Date Time  Temp Pulse Resp B/P (MAP) Pulse Ox O2 Delivery O2 Flow Rate FiO2


 


18 07:56  95  101/62 (75)    


 


18 07:55 97.9       


 


18 03:02  76  92/58 (69)    


 


18 19:27  82  99/49 (66)    


 


18 19:26 98.1  16     


 


18 15:29 98.3  16     


 


18 15:29  80  98/56 (70)    








Lab & Micro Results











Test


  18


09:20


 


Random Glucose 179 MG/DL 








Physical Exam


GENERAL: Well-nourished, well-developed patient.


CARDIOVASCULAR: Regular rate and rhythm without murmurs, gallops, or rubs.


RESPIRATORY: Breath sounds equal bilaterally. No accessory muscle use.


ABDOMEN/GI: Abdomen soft, non-tender. Gravid to 24 weeks


GENITOURINARY:


  Uterine Contractions: none


FHT's:


  Category: 1


  Baseline: 130s


  Reactive: yes


  Variability: moderate


  Decels: none


EXTREMITIES: No cyanosis or edema, non-tender, without signs of DVT.





Assessment and Plan


Assessment and Plan


28 y/o  at 24 weeks, EDC 2018, with T1DM admitted for poor glycemic 

control.


A1c 9.7.


Blood sugars improved significantly on Levemir and NovoLog regimen





Plan:


-- diabetic education completed today with pamphlets given


-- 24hr urine protein collected, results pending, low suspicion for preeclampsia


--Check blood sugar at bedside fasting, 2hr PP, HS, 3am


--Continue the following insulin regimen, with scripts sent to patient's [facility] 

form:


   -Levemir 20U HS


   -Novolog 8U breakfast/8U lunch/12U dinner


   -0 ADA diet


-- SCDs


-- BID fetal strips reassuring while inpatient


-- US 2018 showing reassuring growth; bilateral multicystic dysplastic 

kidneys.  Fetal echo showing normal heart position size structure function and 

rhythm.  Recommending follow-up rescan in 4 weeks from date of exam.





Patient to be discharged home today with counseling to follow-up with careful 

ointment as previously scheduled with recommendation for referral to 

endocrinology vs. maternal fetal medicine given high risk pregnancy.





Patient seen and discussed with Dr. Platt who agrees with plan of care











Bharti Ross MD 2018 08:54

## 2018-02-17 NOTE — EKG
Date Performed: 02/15/2018       Time Performed: 19:30:47

 

PTAGE:      27 years

 

EKG:      Sinus rhythm 

 

 NORMAL ECG 

 

NO PREVIOUS TRACING            

 

DOCTOR:   Kailyn Gamboa  Interpretating Date/Time  02/17/2018 00:29:50

## 2018-02-17 NOTE — HHI.DCPOC
Discharge Care Plan


Diagnosis:  


(1) Uncontrolled type 1 diabetes mellitus


(2) 24 weeks gestation of pregnancy


Report Symptoms to Your Doctor


-Temperature above 100.5 degrees


-Redness, of incision or excessive or foul smelling drainage


-Unusual pain or calf pain


-Increased vaginal bleeding


-Painful or difficulty urinating


-Feelings of extreme sadness or anxiety after 2 weeks


Goals to Promote Your Health


* To prevent worsening of your condition and complications


* To maintain your health at the optimal level


Directions to Meet Your Goals


*** Take your medications as prescribed


*** Follow your dietary instruction


*** Follow activity as directed


*** Ensure plenty of rest for recovery


*** Drink fluids for hydration








*** Keep your appointments as scheduled


*** Take your immunizations and boosters as scheduled


*** If your symptoms worsen call your PCP, if no PCP go to Urgent Care Center 

or Emergency Room***


*** Smoking is Dangerous to Your Health. Avoid second hand smoke***


***Call the 24-hour crisis hotline for domestic abuse at 1-806.884.3973***











Bharti Ross MD Feb 17, 2018 08:52

## 2018-03-07 ENCOUNTER — HOSPITAL ENCOUNTER (OUTPATIENT)
Dept: HOSPITAL 17 - HPND | Age: 28
End: 2018-03-07
Payer: COMMERCIAL

## 2018-03-07 DIAGNOSIS — O24.112: ICD-10-CM

## 2018-03-07 DIAGNOSIS — O35.1XX0: Primary | ICD-10-CM

## 2018-03-07 PROCEDURE — 76816 OB US FOLLOW-UP PER FETUS: CPT

## 2018-03-14 ENCOUNTER — HOSPITAL ENCOUNTER (INPATIENT)
Dept: HOSPITAL 17 - HPND | Age: 28
Discharge: ANOTHER HOSPITAL | DRG: 781 | End: 2018-03-14
Attending: OBSTETRICS & GYNECOLOGY | Admitting: OBSTETRICS & GYNECOLOGY
Payer: COMMERCIAL

## 2018-03-14 VITALS — WEIGHT: 154.32 LBS | HEIGHT: 67 IN | BODY MASS INDEX: 24.22 KG/M2

## 2018-03-14 VITALS
DIASTOLIC BLOOD PRESSURE: 77 MMHG | TEMPERATURE: 98.5 F | HEART RATE: 100 BPM | RESPIRATION RATE: 18 BRPM | SYSTOLIC BLOOD PRESSURE: 124 MMHG

## 2018-03-14 VITALS — DIASTOLIC BLOOD PRESSURE: 70 MMHG | HEART RATE: 105 BPM | SYSTOLIC BLOOD PRESSURE: 103 MMHG

## 2018-03-14 VITALS — HEART RATE: 106 BPM

## 2018-03-14 VITALS — HEART RATE: 111 BPM

## 2018-03-14 VITALS — HEART RATE: 89 BPM

## 2018-03-14 VITALS — RESPIRATION RATE: 20 BRPM

## 2018-03-14 VITALS — RESPIRATION RATE: 18 BRPM

## 2018-03-14 DIAGNOSIS — O41.02X0: Primary | ICD-10-CM

## 2018-03-14 DIAGNOSIS — Z3A.28: ICD-10-CM

## 2018-03-14 DIAGNOSIS — O24.013: ICD-10-CM

## 2018-03-14 DIAGNOSIS — Z79.4: ICD-10-CM

## 2018-03-14 DIAGNOSIS — E10.65: ICD-10-CM

## 2018-03-14 LAB
ALBUMIN SERPL-MCNC: 2.4 GM/DL (ref 3.4–5)
ALP SERPL-CCNC: 67 U/L (ref 45–117)
ALT SERPL-CCNC: 9 U/L (ref 10–53)
AST SERPL-CCNC: 6 U/L (ref 15–37)
BACTERIA #/AREA URNS HPF: (no result) /HPF
BASOPHILS # BLD AUTO: 0 TH/MM3 (ref 0–0.2)
BASOPHILS NFR BLD: 0.1 % (ref 0–2)
BILIRUB SERPL-MCNC: 0.4 MG/DL (ref 0.2–1)
BUN SERPL-MCNC: 9 MG/DL (ref 7–18)
CALCIUM SERPL-MCNC: 8.7 MG/DL (ref 8.5–10.1)
CHLORIDE SERPL-SCNC: 105 MEQ/L (ref 98–107)
COLOR UR: YELLOW
CREAT SERPL-MCNC: 0.51 MG/DL (ref 0.5–1)
EOSINOPHIL # BLD: 0.1 TH/MM3 (ref 0–0.4)
EOSINOPHIL NFR BLD: 0.8 % (ref 0–4)
ERYTHROCYTE [DISTWIDTH] IN BLOOD BY AUTOMATED COUNT: 12.8 % (ref 11.6–17.2)
GFR SERPLBLD BASED ON 1.73 SQ M-ARVRAT: 147 ML/MIN (ref 89–?)
GLUCOSE SERPL-MCNC: 256 MG/DL (ref 74–106)
GLUCOSE UR STRIP-MCNC: 1000 MG/DL
HCO3 BLD-SCNC: 19.5 MEQ/L (ref 21–32)
HCT VFR BLD CALC: 31.5 % (ref 35–46)
HGB BLD-MCNC: 10.9 GM/DL (ref 11.6–15.3)
HGB UR QL STRIP: (no result)
KETONES UR STRIP-MCNC: 80 MG/DL
LYMPHOCYTES # BLD AUTO: 1.7 TH/MM3 (ref 1–4.8)
LYMPHOCYTES NFR BLD AUTO: 20.1 % (ref 9–44)
MCH RBC QN AUTO: 28.7 PG (ref 27–34)
MCHC RBC AUTO-ENTMCNC: 34.5 % (ref 32–36)
MCV RBC AUTO: 83 FL (ref 80–100)
MONOCYTE #: 0.4 TH/MM3 (ref 0–0.9)
MONOCYTES NFR BLD: 4.5 % (ref 0–8)
NEUTROPHILS # BLD AUTO: 6.4 TH/MM3 (ref 1.8–7.7)
NEUTROPHILS NFR BLD AUTO: 74.5 % (ref 16–70)
NITRITE UR QL STRIP: (no result)
PLATELET # BLD: 217 TH/MM3 (ref 150–450)
PMV BLD AUTO: 8.8 FL (ref 7–11)
PROT SERPL-MCNC: 6.3 GM/DL (ref 6.4–8.2)
RBC # BLD AUTO: 3.79 MIL/MM3 (ref 4–5.3)
SODIUM SERPL-SCNC: 136 MEQ/L (ref 136–145)
SP GR UR STRIP: 1.03 (ref 1–1.03)
SQUAMOUS #/AREA URNS HPF: 8 /HPF (ref 0–5)
URINE LEUKOCYTE ESTERASE: (no result)
WBC # BLD AUTO: 8.6 TH/MM3 (ref 4–11)

## 2018-03-14 PROCEDURE — 76815 OB US LIMITED FETUS(S): CPT

## 2018-03-14 PROCEDURE — 71045 X-RAY EXAM CHEST 1 VIEW: CPT

## 2018-03-14 PROCEDURE — 82948 REAGENT STRIP/BLOOD GLUCOSE: CPT

## 2018-03-14 PROCEDURE — 85025 COMPLETE CBC W/AUTO DIFF WBC: CPT

## 2018-03-14 PROCEDURE — 80307 DRUG TEST PRSMV CHEM ANLYZR: CPT

## 2018-03-14 PROCEDURE — 59025 FETAL NON-STRESS TEST: CPT

## 2018-03-14 PROCEDURE — 93005 ELECTROCARDIOGRAM TRACING: CPT

## 2018-03-14 PROCEDURE — 81001 URINALYSIS AUTO W/SCOPE: CPT

## 2018-03-14 PROCEDURE — 84443 ASSAY THYROID STIM HORMONE: CPT

## 2018-03-14 PROCEDURE — 80053 COMPREHEN METABOLIC PANEL: CPT

## 2018-03-14 PROCEDURE — 84550 ASSAY OF BLOOD/URIC ACID: CPT

## 2018-03-14 PROCEDURE — G0481 DRUG TEST DEF 8-14 CLASSES: HCPCS

## 2018-03-14 PROCEDURE — 87086 URINE CULTURE/COLONY COUNT: CPT

## 2018-03-14 PROCEDURE — 83036 HEMOGLOBIN GLYCOSYLATED A1C: CPT

## 2018-03-14 RX ADMIN — INSULIN ASPART SCH: 100 INJECTION, SOLUTION INTRAVENOUS; SUBCUTANEOUS at 22:43

## 2018-03-14 RX ADMIN — INSULIN ASPART SCH: 100 INJECTION, SOLUTION INTRAVENOUS; SUBCUTANEOUS at 17:00

## 2018-03-14 NOTE — RADRPT
EXAM DATE/TIME:  03/14/2018 16:13 

 

[facility] COMPARISON:     

No previous studies available for comparison.

 

                     

INDICATIONS :     

Post central line placement. 

                     

 

MEDICAL HISTORY :     

None.          

 

SURGICAL HISTORY :     

None.   

 

ENCOUNTER:     

Initial                                        

 

ACUITY:     

1 day      

 

PAIN SCORE:     

0/10

 

LOCATION:     

Bilateral chest 

 

FINDINGS:     

A right internal jugular central line has its tip at the junction of the superior vena cava and right
 atrium. There is no pneumothorax. The heart is normal. The pulmonary vascular and is normal. The lynne
gs are clear.

 

CONCLUSION:     No pneumothorax status post placement of right internal jugular central line which ha
s its tip at the junction of the superior vena cava and right atrium in good position. 

 

 

 Beto Queen MD on March 14, 2018 at 16:27           

Board Certified Radiologist.

 This report was verified electronically.

## 2018-03-14 NOTE — HHI.HP
HPI


Chief Complaint


Patient is a 27 year old  010 with a past medical history of type 1 

diabetes (diagnosed at 7 years of age), at 28/0 weeks gestation that presents 

to the [facility] as a direct admit from OB diagnostics for severe 

oligohydramnios.  Patient states that she has been doing very well and denies 

any acute problems at this time.  She was last admitted to the hospital in mid 

2018 for abdominal cramping and decreased fetal movements.  She no 

longer has those problems.  She denies contractions, vaginal bleeding, abnormal 

vaginal discharge, loss of fluid, and has been feeling her baby move normally.


She last saw her endocrinologist in [facility] yesterday who increased 

her insulin regimen to regular NovoLog 9 units at breakfast, 9 units at lunch, 

and 12 units at dinner.  She also takes 20 units of Levemir at around 10:50 PM 

before she goes to bed.  She normally takes her morning insulin at 11 AM which 

is when she has breakfast, afternoon insulin at 2 PM, and dinner at 6 PM.  Her 

fasting blood glucose at 11 AM runs between 50-60, 2 hour postprandial in the 

afternoon runs in the 150s-160s, and 2 hours postprandial in the evening runs 

in the 200s-300s.  Sometimes, she checks her blood glucose level around 2-3 AM 

and it is normally high for which she takes sliding scale NovoLog.


Date Seen:  Mar 14, 2018


Travel History


International Travel<30 Days:  No


Contact w/Intl Traveler<30Days:  No


Known Affected Area:  No





History of Present Illness


Weeks Gestation:  28


Para:  0


:  2


Miscarriage:  1





History


Past Medical History


*** Narrative Medical


Type 1 diabetes





Obstetric History


Obstetric History


 010


-Spontaneous miscarriage at 5 weeks





Past Surgical History


*** Narrative Surgical


Cholecystectomy in 





Family History


*** Narrative Family History


Mother-SLE, MS, transverse myelitis, Bartter's syndrome


Father-hypertension





Social History


*** Narrative Social History


Lives with her  and grandparents


Does not work outside the home


Denies alcohol, tobacco, illicit drug use


Alcohol Use:  No


Tobacco Use:  No


Substance Abuse:  No





Allergies-Medications


(Allergen,Severity, Reaction):  


Coded Allergies:  


     No Known Allergies (Unverified , 2/15/18)


Home Meds


Active Scripts


Prenatal Vit-Iron Carbonyl (Prenatal Plus Iron 29-1 mg) 29 Mg Iron-1 Mg Tab, 1 

TAB PO DAILY, #30 TAB


   Prov:Bharti Ross MD         18


Insulin Aspart Inj (Novolog Flexpen Inj) 300 Unit/3 Ml Pen, 1 UNITS SQ AS 

DIRECTED for Blood Sugar Management, #1 PEN 1 Refill


   Inject 8 units at breakfast, 8 units at lunch, and 12 units at


   dinner.


   Prov:Bharti Ross MD         18


Insulin Detemir Inj (Levemir Flextouch Pen Inj) 300 unit/3 ML Pen, 20 UNITS SQ 

HS for Blood Sugar Management, #1 PEN 1 Refill


   Prov:Bharti Ross MD         18


Metoclopramide (Reglan) 10 Mg Tab, 10 MG PO QID Y for NAUSEA OR VOMITING, #12 

TAB 0 Refills


   Prov:Dada Gonzalez MD         17


Loratadine (Claritin) 10 Mg Cap, 10 MG PO DAILY for Allergy Management, #10 CAP 

0 Refills


   Prov:Dada Gonzalez MD         17





Review of Systems


General / Constitutional:  No: Fever, Chills


Eyes:  No: Blurred Vision


HENT:  No: Headaches


Cardiovascular:  No: Chest Pain or Discomfort


Respiratory:  No: Short of Breath


Gastrointestinal:  No: Nausea, Vomiting


Genitourinary:  No: Frequency, Dysuria, Discharge, Vaginal Bleeding


Musculoskeletal:  No: Cramping


Skin:  No Rash





Physical Exam


Narrative


GENERAL: Well-nourished, well-developed patient.


SKIN: Warm and dry.


HEAD: Normocephalic and atraumatic.


EYES: No scleral icterus. No injection or drainage. 


ENT: No nasal drainage noted. Mucous membranes pink. Airway patent.


NECK: Supple, trachea midline. No JVD.


CARDIOVASCULAR: Regular rate and rhythm without murmurs, gallops, or rubs. 


RESPIRATORY: Breath sounds equal bilaterally. No accessory muscle use.


ABDOMEN/GI: Abdomen soft, non-tender, bowel sounds present, no rebound, no 

guarding 


   Gravid to 28 weeks size


GENITOURINARY: 


   External Genitalia: intact and normal in appearance


   BUS glands: [-]


   Cervix: [-]


   Dilatation: [-]          


   Effacement: [-]          


   Station: [-]  


   Presentation: [-]        


   Membranes: [intact or ruptured]


   Uterine Contractions: None


FHT's: 


   Category: I  


   Baseline: 150  


   Reactive: Multiple accels


   Variability: Moderate


   Decels: None 


EXTREMITIES: No cyanosis or edema.


BACK: Nontender without obvious deformity. No CVA tenderness.


NEUROLOGICAL: Awake and alert. Motor and sensory grossly within normal limits. 

Five out of 5 muscle strength in all muscle groups. Normal speech.





Caprini VTE Risk Assessment


Caprini VTE Risk Assessment:  No/Low Risk (score <= 1)


Caprini Risk Assessment Model











 Point Value = 1          Point Value = 2  Point Value = 3  Point Value = 5


 


Age 41-60


Minor surgery


BMI > 25 kg/m2


Swollen legs


Varicose veins


Pregnancy or postpartum


History of unexplained or recurrent


   spontaneous 


Oral contraceptives or hormone


   replacement


Sepsis (< 1 month)


Serious lung disease, including


   pneumonia (< 1 month)


Abnormal pulmonary function


Acute myocardial infarction


Congestive heart failure (< 1 month)


History of inflammatory bowel disease


Medical patient at bed rest Age 61-74


Arthroscopic surgery


Major open surgery (> 45 min)


Laparoscopic surgery (> 45 min)


Malignancy


Confined to bed (> 72 hours)


Immobilizing plaster cast


Central venous access Age >= 75


History of VTE


Family history of VTE


Factor V Leiden


Prothrombin [id number]


Lupus anticoagulant


Anticardiolipin antibodies


Elevated serum homocysteine


Heparin-induced thrombocytopenia


Other congenital or acquired


   thrombophilia Stroke (< 1 month)


Elective arthroplasty


Hip, pelvis, or leg fracture


Acute spinal cord injury (< 1 month)








Prophylaxis Regimen











   Total Risk


Factor Score Risk Level Prophylaxis Regimen


 


0-1      Low Early ambulation


 


2 Moderate Order ONE of the following:


*Sequential Compression Device (SCD)


*Heparin 5000 units SQ BID


 


3-4 Higher Order ONE of the following medications:


*Heparin 5000 units SQ TID


*Enoxaparin/Lovenox 40 mg SQ daily (WT < 150 kg, CrCl > 30 mL/min)


*Enoxaparin/Lovenox 30 mg SQ daily (WT < 150 kg, CrCl > 10-29 mL/min)


*Enoxaparin/Lovenox 30 mg SQ BID (WT < 150 kg, CrCl > 30 mL/min)


AND/OR


*Sequential Compression Device (SCD)


 


5 or more Highest Order ONE of the following medications:


*Heparin 5000 units SQ TID (Preferred with Epidurals)


*Enoxaparin/Lovenox 40 mg SQ daily (WT < 150 kg, CrCl > 30 mL/min)


*Enoxaparin/Lovenox 30 mg SQ daily (WT < 150 kg, CrCl > 10-29 mL/min)


*Enoxaparin/Lovenox 30 mg SQ BID (WT < 150 kg, CrCl > 30 mL/min)


AND


*Sequential Compression Device (SCD)











Data


Data


Orders





 Orders


Us Ob Limited (3/14/18 )


Lidocaine Pf 1% Inj (Xylocaine-Mpf 1% In (3/14/18 15:15)


Vascular Access Team Consult/P PRN (3/14/18 15:21)


Vascular Poc Ultrasound (3/14/18 )


Place In Observation (3/14/18 )


Vital Signs (Adult) [id number] AWAKE (3/14/18 15:24)


Fetal Heart (3/14/18 15:24)


Activity Oob Ad Katia (3/14/18 15:24)


Complete Blood Count With Diff (3/14/18 15:)


Creatinine Clearance (3/15/18 15:)


Total Protein 24hr Urine (3/14/18 15:24)


Bedside Glucose .AS ORDERED (3/14/18 15:24)


Acetaminophen (Tylenol) (3/14/18 15:30)


Multivit-Min-Folic-Iron Prenat (Stuartna (3/15/18 09:00)


Sodium Chloride 0.9% Flush (Ns Flush) (3/14/18 21:00)


Sodium Chloride 0.9% Flush (Ns Flush) (3/14/18 15:30)


Ondansetron Inj (Zofran Inj) (3/14/18 15:30)


Ob/Psych Drug Screen, Urine (3/14/18 15:24)


Ferrous Sulfate (Ferrous Sulfate) (3/14/18 21:00)


Comprehensive Metabolic Panel (3/14/18 15:24)


Thyroid Stimulating Hormone (3/14/18 15:24)


Urinalysis - C+S If Indicated (3/14/18 15:24)


Specimen To Be Collected PRN (3/14/18 15:)


Uric Acid (3/14/18 15:24)


Lactated Ringer's 1000 Ml Inj (Lr 1000 M (3/14/18 15:30)


Insulin Detemir Inj (Levemir Inj) (3/14/18 22:00)


Blood Glucose Goal (Criteria) (3/14/18 15:24)


Hypoglycemia 70 Mg/Dl Or < (3/14/18 15:24)


Notify Dr: Other (3/14/18 15:24)


Dextrose 50% In Danae (Vial) Inj (D50w (Vi (3/14/18 15:30)


Glucagon Inj (Glucagon Inj) (3/14/18 15:30)


Consult Diabetic Educator (3/14/18 )


Dietary (Dietitian) Consult (3/14/18 )


Code Status (3/14/18 15:24)


Diet Ob Consistent Carb (3/14/18 Dinner)


Insulin Aspart Inj (Novolog Inj) (3/15/18 08:00)


Insulin Aspart Inj (Novolog Inj) (3/14/18 17:00)


Insulin Aspart Inj (Novolog Inj) (3/15/18 12:00)


Insulin Aspart Inj (Novolog Inj) (3/14/18 15:30)


Pamg-1 Test .ONCE (3/14/18 15:24)


Scd Bilateral/Knee High MARK.QSHIFT (3/14/18 15:56)


Electrocardiogram (3/14/18 )


Aspirin Chew (Aspirin Chew) (3/14/18 16:00)


Cbc No Diff, Includes Plts (3/15/18 05:00)


Basic Metabolic Panel (Bmp) (3/15/18 05:00)





Assessment/Plan


Problem List:  


(1) Third trimester pregnancy


ICD Codes:  Z34.93 - Encounter for supervision of normal pregnancy, unspecified

, third trimester


(2) Oligohydramnios in third trimester


ICD Codes:  O41.03X0 - Oligohydramnios, third trimester, not applicable or 

unspecified


(3) Type 1 diabetes mellitus affecting pregnancy in third trimester, antepartum


ICD Codes:  O24.013 - Pre-existing type 1 diabetes mellitus, in pregnancy, 

third trimester


Assessment and Plan


27-year-old  010, type I diabetic, presents with severe oligohydramnios.





Plan 





1. IUP


-Lindo pregnancy in breech position 


-FHT category I - reassuring 


-Continue routine  care


-Continuous fetal monitoring 


-Deliver if signs of fetal distress 


-Further plans per below 





2. Oligohydramnios


-Admit to the antepartum service


-Evaluate for PROM by amnisure


-Maintenance fluids starting with 1/2NS 


-Repeat KODY/dopplers on Friday am 3/16/18


-Neonatology consultation


-[facility] evaluation with 24-hr urine protein and creatinine clearance 


-Holding steroids until serum glucose is under better control 


-Plan to transfer to tertiary center if fluid volume does not improve with 

glucose control and IV hydration 





3. Type I Diabetes


-Poorly controlled  


-History of DKA twice in 


-HgA1C 9.7 in mid February, 9.4 at her endocrinologist's office yesterday on 3/




-Recheck A1C per MFM


-CMP, CBC, Uric acid, TSH labs pending


-Baseline EKG


-Nutrition and diabetic consult during this admission





-Random fingerstick glucose 291 on admission - (5 units NovoLog sq and 5 units 

IV regular insulin administered; recheck to be done at 6pm)


-Based on her history, it appears that her serum glucose runs low in the 

morning and she does not have adequate coverage in the evening into early am. 

She most likely needs basal coverage throughout the day. 


Insulin regimen modified as follows: 


Breakfast: 6 units NovoLog


10 am:      Levemir 10 units


Lunch:      6 units NovoLog


Dinner:     6 units NovoLog


10 pm:     10 units Levemir


Low dose supplemental sliding scale insulin


Accu checks six (6) times/day as follows: Fasting, before breakfast, before 

lunch, before dinner, at 10 pm, and at 3 am


-Titrate insulin regimen as needed based on SSI requirements 


-Can increase breakfast, lunch, and dinner insulin to home regimen of 9, 9, and 

12 units respectively as needed





Seen and discussed with Dr. Platt and Medical student Maya Gay MD Mar 14, 2018 15:58

## 2018-03-14 NOTE — PD.CONS
History of Present Illness


Service





Consult Requested By


Amber Rosado, MSN, [facility]


Reason for Consult


28 weeks gestation, uncontroll type 1 diabetes, infant with bilateral 

multicystic kidneys


Primary Care Physician


No Primary Care Physician


Diagnoses:  


History of Present Illness


 Consult 





Maternal Hx: 26 y/o, G 2 P 0 AB 1, female at 28 weeks gestation with diagnosis 

of Type 1 diabetes, insulin dependent.  


Mother admitted to [facility] on 3/14/18, secondary to uncontrollable diabetes.


Most recent Ultrasound on 3/14/18 confirms intrauterine pregnancy, male infant 

with multicystic kidneys bilaterally, amniotic fluid index of 1.9.   


EDC of 2018


Estimated fetal weight is 1139 grams on 3/7/18 sonogram.


Maternal risk factors/complications: Type 1 diabetes, insulin dependent, 

uncontrolled





Maternal Labs:





Pending as of 3/14/18 2000hrs





Blood type ., Rubella ..,  RPR.., GC ., Chlamydia .., Hepatitis B .,


HIV ., GBS ., Other 





Maternal Medications:  


PNV, Iron, Lantus, Humulog





Social: 


Marital status: 


Resides locally in [place]





Family Hx:


Mother reports no genetic or inherited conditions. Maternal grandmother with 

Lupus.





Substance Abuse: 


Denies 








Discussion:


Nurse Practitioner met with mother and maternal grandmother in room regarding 

threatened  delivery at 28 weeks gestation secondary to maternal diabetes

, infant with bilateral multicystic kidneys and low amniotic fluid index of 

1.9.   This consultation discussed the possibility of infant with low amniotic 

fluid and bilateral multicystic kidneys have increase risk of hypoplastic lungs 

which is made difficult to provide ventilatory support at a level 2 NICU.  As 

well as infant of diabetic mother having increase risk of respiratory distress 

syndrome due to surfactant deficiency and hypoglycemia.  The discussion 

included generalized care of the baby in the NICU, common problems, 

complications and survival and/or disability potential if delivered at this 

time.   Discussed with mother the infant will need to be transported to [facility] if requires Level 3 care.  Mother and maternal grandmother 

asked appropriate questions and agreed to being transferred prior to delivery 

if infant needs require Level 3 care.  


Discussed with Dr. Benavides via phone and agrees that mother should be transferred 

to [facility].  Discussed with Dr. Platt the OB hospitalist and strongly recommend 

transferring mother to [facility] for further management and care of both mother and 

infant. 








Greater than 50% of the consultation time was spent with the patient.




















Amber Rosado, MSN, NNP-BC      3/14/18


___________________________                                     ________________

__________


                                                                               

                   


   Nurse practitioner                                                  

                Date





Past Family Social History


Allergies:  


Coded Allergies:  


     morphine (Verified  Allergy, Mild, Headache, 3/14/18)





Physical Exam


Vital Signs





Vital Signs








  Date Time  Temp Pulse Resp B/P (MAP) Pulse Ox O2 Delivery O2 Flow Rate FiO2


 


3/14/18 19:59   18     


 


3/14/18 19:59  100  124/77 (93)    


 


3/14/18 19:59 98.5       


 


3/14/18 18:00   18     


 


3/14/18 17:20  111      


 


3/14/18 17:15  106      


 


3/14/18 17:10  111      


 


3/14/18 17:05  105  103/70 (81)    


 


3/14/18 17:05  101      


 


3/14/18 17:00  89      








Physical Exam


GENERAL: This is a well-nourished, well-developed patient, in no apparent 

distress.


SKIN: No rashes, ecchymoses or lesions. Cool and dry.


HEAD: Atraumatic. Normocephalic. No temporal or scalp tenderness.


EYES: Pupils equal round and reactive. Extraocular motions intact. No scleral 

icterus. No injection or drainage. 


ENT: Nose without bleeding, purulent drainage or septal hematoma. Throat 

without erythema, tonsillar hypertrophy or exudate. Uvula midline. Airway 

patent.


NECK: Trachea midline. No JVD or lymphadenopathy. Supple, nontender, no 

meningeal signs.


CARDIOVASCULAR: Regular rate and rhythm without murmurs, gallops, or rubs. 


RESPIRATORY: Clear to auscultation. Breath sounds equal bilaterally. No wheezes

, rales, or rhonchi.  


GASTROINTESTINAL: Abdomen soft, non-tender, nondistended. No hepato-splenomegaly

, or palpable masses. No guarding.


MUSCULOSKELETAL: Extremities without clubbing, cyanosis, or edema. No joint 

tenderness, effusion, or edema noted. No calf tenderness. Negative Homans sign 

bilaterally.


NEUROLOGICAL: Awake and alert. Cranial nerves II through XII intact.  Motor and 

sensory grossly within normal limits. Five out of 5 muscle strength in all 

muscle groups.  Normal speech.


Laboratory





Laboratory Tests








Test


  3/14/18


14:45 3/14/18


16:00


 


Urine Color YELLOW  


 


Urine Turbidity HAZY  


 


Urine pH 6.0  


 


Urine Specific Gravity 1.035  


 


Urine Protein NEG  


 


Urine Glucose (UA) 1000  


 


Urine Ketones 80  


 


Urine Occult Blood NEG  


 


Urine Nitrite NEG  


 


Urine Bilirubin NEG  


 


Urine Urobilinogen LESS THAN 2.0  


 


Urine Leukocyte Esterase MOD  


 


Urine RBC LESS THAN 1  


 


Urine WBC 3  


 


Urine Squamous Epithelial


Cells 8 


  


 


 


Urine Bacteria MOD  


 


Microscopic Urinalysis Comment


  CULTURE


INDICATED 


 


 


Urine Opiates Screen NEG  


 


Urine Barbiturates Screen NEG  


 


Urine Amphetamines Screen NEG  


 


Urine Benzodiazepines Screen NEG  


 


Urine Cocaine Screen NEG  


 


Urine Cannabinoids Screen NEG  


 


White Blood Count  8.6 


 


Red Blood Count  3.79 


 


Hemoglobin  10.9 


 


Hematocrit  31.5 


 


Mean Corpuscular Volume  83.0 


 


Mean Corpuscular Hemoglobin  28.7 


 


Mean Corpuscular Hemoglobin


Concent 


  34.5 


 


 


Red Cell Distribution Width  12.8 


 


Platelet Count  217 


 


Mean Platelet Volume  8.8 


 


Neutrophils (%) (Auto)  74.5 


 


Lymphocytes (%) (Auto)  20.1 


 


Monocytes (%) (Auto)  4.5 


 


Eosinophils (%) (Auto)  0.8 


 


Basophils (%) (Auto)  0.1 


 


Neutrophils # (Auto)  6.4 


 


Lymphocytes # (Auto)  1.7 


 


Monocytes # (Auto)  0.4 


 


Eosinophils # (Auto)  0.1 


 


Basophils # (Auto)  0.0 


 


CBC Comment  DIFF FINAL 


 


Differential Comment   


 


Blood Urea Nitrogen  9 


 


Creatinine  0.51 


 


Random Glucose  256 


 


Total Protein  6.3 


 


Albumin  2.4 


 


Calcium Level  8.7 


 


Uric Acid  3.2 


 


Alkaline Phosphatase  67 


 


Aspartate Amino Transf


(AST/SGOT) 


  6 


 


 


Alanine Aminotransferase


(ALT/SGPT) 


  9 


 


 


Total Bilirubin  0.4 


 


Sodium Level  136 


 


Potassium Level  4.2 


 


Chloride Level  105 


 


Carbon Dioxide Level  19.5 


 


Anion Gap  12 


 


Estimat Glomerular Filtration


Rate 


  147 


 


 


Thyroid Stimulating Hormone


3rd Gen 


  0.824 


 














 Date/Time


Source Procedure


Growth Status


 


 


 3/14/18 14:45


Urine Clean Catch Urine Culture


Pending Received








Result Diagram:  


3/14/18 1600                                                                   

             3/14/18 1600














Amber Rosado Mar 14, 2018 20:26

## 2018-03-15 LAB — HBA1C MFR BLD: 9.2 % (ref 4.3–6)

## 2018-03-15 NOTE — EKG
Date Performed: 2018       Time Performed: 16:26:58

 

PTAGE:      27 years

 

EKG:      Sinus rhythm 

 

 WITH SHORT NV INTERVAL BORDERLINE ECG Since the 

 

 PREVIOUS TRACING            , no significant change noted PREVIOUS TRACIN/15/2018 19.30

 

DOCTOR:   Mago Avendano  Interpretating Date/Time  03/15/2018 13:55:47